# Patient Record
Sex: FEMALE | Race: WHITE | NOT HISPANIC OR LATINO
[De-identification: names, ages, dates, MRNs, and addresses within clinical notes are randomized per-mention and may not be internally consistent; named-entity substitution may affect disease eponyms.]

---

## 2021-04-16 ENCOUNTER — APPOINTMENT (OUTPATIENT)
Dept: FAMILY MEDICINE | Facility: CLINIC | Age: 31
End: 2021-04-16
Payer: COMMERCIAL

## 2021-04-16 ENCOUNTER — NON-APPOINTMENT (OUTPATIENT)
Age: 31
End: 2021-04-16

## 2021-04-16 VITALS
WEIGHT: 124 LBS | OXYGEN SATURATION: 99 % | TEMPERATURE: 98.4 F | SYSTOLIC BLOOD PRESSURE: 122 MMHG | HEART RATE: 77 BPM | HEIGHT: 63 IN | DIASTOLIC BLOOD PRESSURE: 82 MMHG | BODY MASS INDEX: 21.97 KG/M2

## 2021-04-16 DIAGNOSIS — Z78.9 OTHER SPECIFIED HEALTH STATUS: ICD-10-CM

## 2021-04-16 DIAGNOSIS — Z86.16 PERSONAL HISTORY OF COVID-19: ICD-10-CM

## 2021-04-16 DIAGNOSIS — Z82.49 FAMILY HISTORY OF ISCHEMIC HEART DISEASE AND OTHER DISEASES OF THE CIRCULATORY SYSTEM: ICD-10-CM

## 2021-04-16 PROCEDURE — 99213 OFFICE O/P EST LOW 20 MIN: CPT

## 2021-04-16 PROCEDURE — 99072 ADDL SUPL MATRL&STAF TM PHE: CPT

## 2021-04-16 RX ORDER — ASCORBIC ACID 500 MG
TABLET ORAL
Refills: 0 | Status: ACTIVE | COMMUNITY

## 2021-04-16 RX ORDER — MULTIVITAMIN
TABLET ORAL
Refills: 0 | Status: ACTIVE | COMMUNITY

## 2021-04-16 NOTE — HISTORY OF PRESENT ILLNESS
[FreeTextEntry1] : post COVID infection symptoms [de-identified] : Patient is s/p positive PCR test 3/31.  She had a fever, chills, body aches, a cough, body aches, chills, a sore throat, migraine headaches and the loss of taste and smell.  Most of her symptoms have resolved, but she does still have headaches, and a loss of taste and smell.    Overall, she does feel much better, and will be returning to work.  She would like to make sure she is stable to return.

## 2021-04-16 NOTE — PHYSICAL EXAM
[No Acute Distress] : no acute distress [Well Nourished] : well nourished [Well Developed] : well developed [Well-Appearing] : well-appearing [EOMI] : extraocular movements intact [Normal Outer Ear/Nose] : the outer ears and nose were normal in appearance [Normal Oropharynx] : the oropharynx was normal [No JVD] : no jugular venous distention [No Lymphadenopathy] : no lymphadenopathy [Supple] : supple [Thyroid Normal, No Nodules] : the thyroid was normal and there were no nodules present [No Respiratory Distress] : no respiratory distress  [No Accessory Muscle Use] : no accessory muscle use [Clear to Auscultation] : lungs were clear to auscultation bilaterally [Normal Rate] : normal rate  [Regular Rhythm] : with a regular rhythm [Normal S1, S2] : normal S1 and S2 [No Murmur] : no murmur heard [Soft] : abdomen soft [Non Tender] : non-tender [Non-distended] : non-distended [No Masses] : no abdominal mass palpated [No HSM] : no HSM [Normal Bowel Sounds] : normal bowel sounds [Normal Posterior Cervical Nodes] : no posterior cervical lymphadenopathy [Normal Anterior Cervical Nodes] : no anterior cervical lymphadenopathy [No Joint Swelling] : no joint swelling [Grossly Normal Strength/Tone] : grossly normal strength/tone [No Rash] : no rash [Coordination Grossly Intact] : coordination grossly intact [No Focal Deficits] : no focal deficits [Normal Gait] : normal gait [Deep Tendon Reflexes (DTR)] : deep tendon reflexes were 2+ and symmetric [Normal Affect] : the affect was normal [Normal Insight/Judgement] : insight and judgment were intact

## 2021-04-16 NOTE — PLAN
[FreeTextEntry1] : Patient has been reassured that her symptoms are to be expected as she heals from COVID- 19.  She is advised to make sure that she hydrates very well, takes zinc, vitamin C, B12 and black elderberry, and continues to eat well and exercise.  This will continue to take time to improve, but she will continue to do so with time and the above.  For any residual congestion, she may use Flonase and Mucinex.  She may also use Tylenol for any residual body aches, and for her headache.

## 2021-07-09 ENCOUNTER — APPOINTMENT (OUTPATIENT)
Dept: FAMILY MEDICINE | Facility: CLINIC | Age: 31
End: 2021-07-09

## 2021-07-13 ENCOUNTER — APPOINTMENT (OUTPATIENT)
Dept: FAMILY MEDICINE | Facility: CLINIC | Age: 31
End: 2021-07-13

## 2021-09-20 ENCOUNTER — APPOINTMENT (OUTPATIENT)
Dept: FAMILY MEDICINE | Facility: CLINIC | Age: 31
End: 2021-09-20

## 2021-10-11 ENCOUNTER — APPOINTMENT (OUTPATIENT)
Dept: FAMILY MEDICINE | Facility: CLINIC | Age: 31
End: 2021-10-11

## 2021-10-19 ENCOUNTER — APPOINTMENT (OUTPATIENT)
Dept: FAMILY MEDICINE | Facility: CLINIC | Age: 31
End: 2021-10-19
Payer: COMMERCIAL

## 2021-10-19 VITALS
WEIGHT: 123 LBS | SYSTOLIC BLOOD PRESSURE: 112 MMHG | BODY MASS INDEX: 21.79 KG/M2 | OXYGEN SATURATION: 99 % | DIASTOLIC BLOOD PRESSURE: 72 MMHG | TEMPERATURE: 98.8 F | RESPIRATION RATE: 16 BRPM | HEIGHT: 63 IN | HEART RATE: 80 BPM

## 2021-10-19 DIAGNOSIS — Z00.00 ENCOUNTER FOR GENERAL ADULT MEDICAL EXAMINATION W/OUT ABNORMAL FINDINGS: ICD-10-CM

## 2021-10-19 PROCEDURE — 36415 COLL VENOUS BLD VENIPUNCTURE: CPT

## 2021-10-19 PROCEDURE — G0442 ANNUAL ALCOHOL SCREEN 15 MIN: CPT

## 2021-10-19 PROCEDURE — 99395 PREV VISIT EST AGE 18-39: CPT | Mod: 25

## 2021-10-19 NOTE — HISTORY OF PRESENT ILLNESS
[FreeTextEntry1] : Routine PE [de-identified] : Patient's last PE was 1 year ago.  She follows a normal diet, and she does exercise regularly.  Patient's last visit with the dentist was less than 1 year ago, and she is now due for the ophthalmologist. She feels well overall. She is utd with all vaccinations aside from the COVID vaccine.

## 2021-10-19 NOTE — HEALTH RISK ASSESSMENT
[None] : None [Employed] : employed [College] : College [Single] : single [Sexually Active] : sexually active [Feels Safe at Home] : Feels safe at home [Fully functional (bathing, dressing, toileting, transferring, walking, feeding)] : Fully functional (bathing, dressing, toileting, transferring, walking, feeding) [Fully functional (using the telephone, shopping, preparing meals, housekeeping, doing laundry, using] : Fully functional and needs no help or supervision to perform IADLs (using the telephone, shopping, preparing meals, housekeeping, doing laundry, using transportation, managing medications and managing finances) [Reports normal functional visual acuity (ie: able to read med bottle)] : Reports normal functional visual acuity [Smoke Detector] : smoke detector [Carbon Monoxide Detector] : carbon monoxide detector [Safety elements used in home] : safety elements used in home [Seat Belt] :  uses seat belt [Sunscreen] : uses sunscreen [Yes] : Yes [Monthly or less (1 pt)] : Monthly or less (1 point) [1 or 2 (0 pts)] : 1 or 2 (0 points) [Never (0 pts)] : Never (0 points) [No] : In the past 12 months have you used drugs other than those required for medical reasons? No [No falls in past year] : Patient reported no falls in the past year [0] : 2) Feeling down, depressed, or hopeless: Not at all (0) [PHQ-2 Negative - No further assessment needed] : PHQ-2 Negative - No further assessment needed [Patient reported PAP Smear was normal] : Patient reported PAP Smear was normal [HIV Test offered] : HIV Test offered [With Significant Other] : lives with significant other [] : No [Audit-CScore] : 1 [DUJ0Eeslj] : 0 [Change in mental status noted] : No change in mental status noted [High Risk Behavior] : no high risk behavior [Reports changes in hearing] : Reports no changes in hearing [Reports changes in vision] : Reports no changes in vision [Reports changes in dental health] : Reports no changes in dental health [Guns at Home] : no guns at home [Travel to Developing Areas] : does not  travel to developing areas [TB Exposure] : is not being exposed to tuberculosis [PapSmearDate] : 4/2021 [FreeTextEntry2] : [facility]

## 2021-10-19 NOTE — PLAN
[FreeTextEntry1] : Patient is advised to continue with her current diet and exercise. She  is also advised to make sure that she follows up with the ophthalmologist and dentist annually which she does do. She is also advised to make sure to also follow up with the dermatologist for routine skin checks.  She is also advised to make sure that she exercises when possible, and follows up for any medical issues that arise. Her labs will be checked as noted above.  She is utd with all vaccinations aside from the COVID vaccine, and she will consider this for the future.  \par \par Pregnancy test also ordered as requested to confirm pregnancy.  She did have a positive urine test 2 weeks ago, and is scheduled to see gyn next month.  \par

## 2021-10-20 ENCOUNTER — NON-APPOINTMENT (OUTPATIENT)
Age: 31
End: 2021-10-20

## 2021-10-20 LAB
25(OH)D3 SERPL-MCNC: 43.3 NG/ML
ALBUMIN SERPL ELPH-MCNC: 4.3 G/DL
ALP BLD-CCNC: 45 U/L
ALT SERPL-CCNC: 8 U/L
ANION GAP SERPL CALC-SCNC: 15 MMOL/L
AST SERPL-CCNC: 12 U/L
BASOPHILS # BLD AUTO: 0.07 K/UL
BASOPHILS NFR BLD AUTO: 0.6 %
BILIRUB SERPL-MCNC: <0.2 MG/DL
BUN SERPL-MCNC: 14 MG/DL
CALCIUM SERPL-MCNC: 9.9 MG/DL
CHLORIDE SERPL-SCNC: 99 MMOL/L
CHOLEST SERPL-MCNC: 127 MG/DL
CO2 SERPL-SCNC: 20 MMOL/L
CREAT SERPL-MCNC: 0.55 MG/DL
CYTOLOGY CVX/VAG DOC THIN PREP: NORMAL
EOSINOPHIL # BLD AUTO: 0.27 K/UL
EOSINOPHIL NFR BLD AUTO: 2.5 %
GLUCOSE SERPL-MCNC: 91 MG/DL
HCG SERPL QL: POSITIVE
HCT VFR BLD CALC: 36.2 %
HDLC SERPL-MCNC: 49 MG/DL
HGB BLD-MCNC: 12.5 G/DL
IMM GRANULOCYTES NFR BLD AUTO: 0.4 %
LDLC SERPL CALC-MCNC: 68 MG/DL
LYMPHOCYTES # BLD AUTO: 2.24 K/UL
LYMPHOCYTES NFR BLD AUTO: 20.5 %
MAN DIFF?: NORMAL
MCHC RBC-ENTMCNC: 29.1 PG
MCHC RBC-ENTMCNC: 34.5 GM/DL
MCV RBC AUTO: 84.2 FL
MONOCYTES # BLD AUTO: 0.74 K/UL
MONOCYTES NFR BLD AUTO: 6.8 %
NEUTROPHILS # BLD AUTO: 7.59 K/UL
NEUTROPHILS NFR BLD AUTO: 69.2 %
NONHDLC SERPL-MCNC: 78 MG/DL
PAPP-A SERPL-ACNC: NORMAL MIU/ML
PLATELET # BLD AUTO: 240 K/UL
POTASSIUM SERPL-SCNC: 4.2 MMOL/L
PROT SERPL-MCNC: 6.9 G/DL
RBC # BLD: 4.3 M/UL
RBC # FLD: 12.3 %
SODIUM SERPL-SCNC: 135 MMOL/L
TRIGL SERPL-MCNC: 47 MG/DL
TSH SERPL-ACNC: 1.3 UIU/ML
WBC # FLD AUTO: 10.95 K/UL

## 2022-04-29 ENCOUNTER — INPATIENT (INPATIENT)
Facility: HOSPITAL | Age: 32
LOS: 0 days | Discharge: HOME | DRG: 833 | End: 2022-04-29
Attending: OBSTETRICS & GYNECOLOGY | Admitting: OBSTETRICS & GYNECOLOGY
Payer: COMMERCIAL

## 2022-04-29 DIAGNOSIS — O26.899 OTHER SPECIFIED PREGNANCY RELATED CONDITIONS, UNSPECIFIED TRIMESTER: ICD-10-CM

## 2022-04-29 LAB
ALBUMIN SERPL ELPH-MCNC: 2.9 G/DL — LOW (ref 3.3–5)
ALP SERPL-CCNC: 97 U/L — SIGNIFICANT CHANGE UP (ref 40–120)
ALT FLD-CCNC: 25 U/L — SIGNIFICANT CHANGE UP (ref 12–78)
ANION GAP SERPL CALC-SCNC: 6 MMOL/L — SIGNIFICANT CHANGE UP (ref 5–17)
APPEARANCE UR: CLEAR — SIGNIFICANT CHANGE UP
APTT BLD: 25.5 SEC — LOW (ref 27.5–35.5)
AST SERPL-CCNC: 21 U/L — SIGNIFICANT CHANGE UP (ref 15–37)
BASOPHILS # BLD AUTO: 0.05 K/UL — SIGNIFICANT CHANGE UP (ref 0–0.2)
BASOPHILS NFR BLD AUTO: 0.4 % — SIGNIFICANT CHANGE UP (ref 0–2)
BILIRUB SERPL-MCNC: 0.2 MG/DL — SIGNIFICANT CHANGE UP (ref 0.2–1.2)
BILIRUB UR-MCNC: NEGATIVE — SIGNIFICANT CHANGE UP
BUN SERPL-MCNC: 8 MG/DL — SIGNIFICANT CHANGE UP (ref 7–23)
CALCIUM SERPL-MCNC: 8.4 MG/DL — LOW (ref 8.5–10.1)
CHLORIDE SERPL-SCNC: 108 MMOL/L — SIGNIFICANT CHANGE UP (ref 96–108)
CO2 SERPL-SCNC: 24 MMOL/L — SIGNIFICANT CHANGE UP (ref 22–31)
COLOR SPEC: YELLOW — SIGNIFICANT CHANGE UP
CREAT ?TM UR-MCNC: 123 MG/DL — SIGNIFICANT CHANGE UP
CREAT SERPL-MCNC: 0.46 MG/DL — LOW (ref 0.5–1.3)
DIFF PNL FLD: NEGATIVE — SIGNIFICANT CHANGE UP
EGFR: 133 ML/MIN/1.73M2 — SIGNIFICANT CHANGE UP
EOSINOPHIL # BLD AUTO: 0.1 K/UL — SIGNIFICANT CHANGE UP (ref 0–0.5)
EOSINOPHIL NFR BLD AUTO: 0.9 % — SIGNIFICANT CHANGE UP (ref 0–6)
FIBRINOGEN PPP-MCNC: 512 MG/DL — SIGNIFICANT CHANGE UP (ref 330–520)
FIBRONECTIN FETAL SPEC QL: NEGATIVE — SIGNIFICANT CHANGE UP
GLUCOSE SERPL-MCNC: 89 MG/DL — SIGNIFICANT CHANGE UP (ref 70–99)
GLUCOSE UR QL: NEGATIVE — SIGNIFICANT CHANGE UP
HCT VFR BLD CALC: 32.2 % — LOW (ref 34.5–45)
HGB BLD-MCNC: 11 G/DL — LOW (ref 11.5–15.5)
IMM GRANULOCYTES NFR BLD AUTO: 0.5 % — SIGNIFICANT CHANGE UP (ref 0–1.5)
INR BLD: 0.95 RATIO — SIGNIFICANT CHANGE UP (ref 0.88–1.16)
KETONES UR-MCNC: NEGATIVE — SIGNIFICANT CHANGE UP
LDH SERPL L TO P-CCNC: 172 U/L — SIGNIFICANT CHANGE UP (ref 84–241)
LEUKOCYTE ESTERASE UR-ACNC: ABNORMAL
LYMPHOCYTES # BLD AUTO: 1.24 K/UL — SIGNIFICANT CHANGE UP (ref 1–3.3)
LYMPHOCYTES # BLD AUTO: 10.6 % — LOW (ref 13–44)
MCHC RBC-ENTMCNC: 29.3 PG — SIGNIFICANT CHANGE UP (ref 27–34)
MCHC RBC-ENTMCNC: 34.2 GM/DL — SIGNIFICANT CHANGE UP (ref 32–36)
MCV RBC AUTO: 85.9 FL — SIGNIFICANT CHANGE UP (ref 80–100)
MONOCYTES # BLD AUTO: 0.69 K/UL — SIGNIFICANT CHANGE UP (ref 0–0.9)
MONOCYTES NFR BLD AUTO: 5.9 % — SIGNIFICANT CHANGE UP (ref 2–14)
NEUTROPHILS # BLD AUTO: 9.53 K/UL — HIGH (ref 1.8–7.4)
NEUTROPHILS NFR BLD AUTO: 81.7 % — HIGH (ref 43–77)
NITRITE UR-MCNC: NEGATIVE — SIGNIFICANT CHANGE UP
PH UR: 5 — SIGNIFICANT CHANGE UP (ref 5–8)
PLATELET # BLD AUTO: 161 K/UL — SIGNIFICANT CHANGE UP (ref 150–400)
POTASSIUM SERPL-MCNC: 3.9 MMOL/L — SIGNIFICANT CHANGE UP (ref 3.5–5.3)
POTASSIUM SERPL-SCNC: 3.9 MMOL/L — SIGNIFICANT CHANGE UP (ref 3.5–5.3)
PROT ?TM UR-MCNC: 22 MG/DL — HIGH (ref 0–12)
PROT SERPL-MCNC: 6.2 GM/DL — SIGNIFICANT CHANGE UP (ref 6–8.3)
PROT UR-MCNC: NEGATIVE — SIGNIFICANT CHANGE UP
PROT/CREAT UR-RTO: 0.2 RATIO — SIGNIFICANT CHANGE UP (ref 0–0.2)
PROTHROM AB SERPL-ACNC: 11 SEC — SIGNIFICANT CHANGE UP (ref 10.5–13.4)
RBC # BLD: 3.75 M/UL — LOW (ref 3.8–5.2)
RBC # FLD: 13.2 % — SIGNIFICANT CHANGE UP (ref 10.3–14.5)
SODIUM SERPL-SCNC: 138 MMOL/L — SIGNIFICANT CHANGE UP (ref 135–145)
SP GR SPEC: 1.02 — SIGNIFICANT CHANGE UP (ref 1.01–1.02)
URATE SERPL-MCNC: 4.1 MG/DL — SIGNIFICANT CHANGE UP (ref 2.5–7)
UROBILINOGEN FLD QL: NEGATIVE — SIGNIFICANT CHANGE UP
WBC # BLD: 11.67 K/UL — HIGH (ref 3.8–10.5)
WBC # FLD AUTO: 11.67 K/UL — HIGH (ref 3.8–10.5)

## 2022-04-29 PROCEDURE — 84550 ASSAY OF BLOOD/URIC ACID: CPT

## 2022-04-29 PROCEDURE — 76815 OB US LIMITED FETUS(S): CPT | Mod: 26

## 2022-04-29 PROCEDURE — 59025 FETAL NON-STRESS TEST: CPT

## 2022-04-29 PROCEDURE — G0463: CPT

## 2022-04-29 PROCEDURE — 76817 TRANSVAGINAL US OBSTETRIC: CPT

## 2022-04-29 PROCEDURE — 85025 COMPLETE CBC W/AUTO DIFF WBC: CPT

## 2022-04-29 PROCEDURE — 81001 URINALYSIS AUTO W/SCOPE: CPT

## 2022-04-29 PROCEDURE — 80053 COMPREHEN METABOLIC PANEL: CPT

## 2022-04-29 PROCEDURE — 96360 HYDRATION IV INFUSION INIT: CPT

## 2022-04-29 PROCEDURE — 87800 DETECT AGNT MULT DNA DIREC: CPT

## 2022-04-29 PROCEDURE — 85730 THROMBOPLASTIN TIME PARTIAL: CPT

## 2022-04-29 PROCEDURE — 85384 FIBRINOGEN ACTIVITY: CPT

## 2022-04-29 PROCEDURE — 99283 EMERGENCY DEPT VISIT LOW MDM: CPT

## 2022-04-29 PROCEDURE — 84156 ASSAY OF PROTEIN URINE: CPT

## 2022-04-29 PROCEDURE — 76817 TRANSVAGINAL US OBSTETRIC: CPT | Mod: 26

## 2022-04-29 PROCEDURE — 83615 LACTATE (LD) (LDH) ENZYME: CPT

## 2022-04-29 PROCEDURE — 87086 URINE CULTURE/COLONY COUNT: CPT

## 2022-04-29 PROCEDURE — 36415 COLL VENOUS BLD VENIPUNCTURE: CPT

## 2022-04-29 PROCEDURE — 82570 ASSAY OF URINE CREATININE: CPT

## 2022-04-29 PROCEDURE — 82731 ASSAY OF FETAL FIBRONECTIN: CPT

## 2022-04-29 PROCEDURE — 85610 PROTHROMBIN TIME: CPT

## 2022-04-29 PROCEDURE — 76815 OB US LIMITED FETUS(S): CPT

## 2022-04-29 RX ORDER — SODIUM CHLORIDE 9 MG/ML
1000 INJECTION, SOLUTION INTRAVENOUS ONCE
Refills: 0 | Status: COMPLETED | OUTPATIENT
Start: 2022-04-29 | End: 2022-04-29

## 2022-04-29 RX ADMIN — SODIUM CHLORIDE 1000 MILLILITER(S): 9 INJECTION, SOLUTION INTRAVENOUS at 12:48

## 2022-04-30 LAB
CANDIDA AB TITR SER: SIGNIFICANT CHANGE UP
G VAGINALIS DNA SPEC QL NAA+PROBE: DETECTED
T VAGINALIS SPEC QL WET PREP: SIGNIFICANT CHANGE UP

## 2022-05-01 LAB
CULTURE RESULTS: SIGNIFICANT CHANGE UP
SPECIMEN SOURCE: SIGNIFICANT CHANGE UP

## 2022-05-02 DIAGNOSIS — O60.03 PRETERM LABOR WITHOUT DELIVERY, THIRD TRIMESTER: ICD-10-CM

## 2022-05-02 DIAGNOSIS — Z3A.33 33 WEEKS GESTATION OF PREGNANCY: ICD-10-CM

## 2022-05-02 DIAGNOSIS — R51.9 HEADACHE, UNSPECIFIED: ICD-10-CM

## 2022-05-02 DIAGNOSIS — O99.891 OTHER SPECIFIED DISEASES AND CONDITIONS COMPLICATING PREGNANCY: ICD-10-CM

## 2022-06-14 ENCOUNTER — INPATIENT (INPATIENT)
Facility: HOSPITAL | Age: 32
LOS: 2 days | Discharge: HOME | End: 2022-06-17
Attending: OBSTETRICS & GYNECOLOGY | Admitting: OBSTETRICS & GYNECOLOGY
Payer: COMMERCIAL

## 2022-06-14 VITALS — WEIGHT: 153 LBS | HEIGHT: 62 IN

## 2022-06-14 DIAGNOSIS — O26.899 OTHER SPECIFIED PREGNANCY RELATED CONDITIONS, UNSPECIFIED TRIMESTER: ICD-10-CM

## 2022-06-14 LAB
BASOPHILS # BLD AUTO: 0.05 K/UL — SIGNIFICANT CHANGE UP (ref 0–0.2)
BASOPHILS NFR BLD AUTO: 0.5 % — SIGNIFICANT CHANGE UP (ref 0–2)
COVID-19 SPIKE DOMAIN AB INTERP: POSITIVE
COVID-19 SPIKE DOMAIN ANTIBODY RESULT: >250 U/ML — HIGH
EOSINOPHIL # BLD AUTO: 0.14 K/UL — SIGNIFICANT CHANGE UP (ref 0–0.5)
EOSINOPHIL NFR BLD AUTO: 1.4 % — SIGNIFICANT CHANGE UP (ref 0–6)
HCT VFR BLD CALC: 35.5 % — SIGNIFICANT CHANGE UP (ref 34.5–45)
HGB BLD-MCNC: 12.5 G/DL — SIGNIFICANT CHANGE UP (ref 11.5–15.5)
IMM GRANULOCYTES NFR BLD AUTO: 0.5 % — SIGNIFICANT CHANGE UP (ref 0–1.5)
LYMPHOCYTES # BLD AUTO: 1.33 K/UL — SIGNIFICANT CHANGE UP (ref 1–3.3)
LYMPHOCYTES # BLD AUTO: 13.7 % — SIGNIFICANT CHANGE UP (ref 13–44)
MCHC RBC-ENTMCNC: 29.7 PG — SIGNIFICANT CHANGE UP (ref 27–34)
MCHC RBC-ENTMCNC: 35.2 GM/DL — SIGNIFICANT CHANGE UP (ref 32–36)
MCV RBC AUTO: 84.3 FL — SIGNIFICANT CHANGE UP (ref 80–100)
MONOCYTES # BLD AUTO: 0.56 K/UL — SIGNIFICANT CHANGE UP (ref 0–0.9)
MONOCYTES NFR BLD AUTO: 5.8 % — SIGNIFICANT CHANGE UP (ref 2–14)
NEUTROPHILS # BLD AUTO: 7.56 K/UL — HIGH (ref 1.8–7.4)
NEUTROPHILS NFR BLD AUTO: 78.1 % — HIGH (ref 43–77)
PLATELET # BLD AUTO: 152 K/UL — SIGNIFICANT CHANGE UP (ref 150–400)
RBC # BLD: 4.21 M/UL — SIGNIFICANT CHANGE UP (ref 3.8–5.2)
RBC # FLD: 13.5 % — SIGNIFICANT CHANGE UP (ref 10.3–14.5)
SARS-COV-2 IGG+IGM SERPL QL IA: >250 U/ML — HIGH
SARS-COV-2 IGG+IGM SERPL QL IA: POSITIVE
WBC # BLD: 9.69 K/UL — SIGNIFICANT CHANGE UP (ref 3.8–10.5)
WBC # FLD AUTO: 9.69 K/UL — SIGNIFICANT CHANGE UP (ref 3.8–10.5)

## 2022-06-14 PROCEDURE — 86900 BLOOD TYPING SEROLOGIC ABO: CPT

## 2022-06-14 PROCEDURE — 94760 N-INVAS EAR/PLS OXIMETRY 1: CPT

## 2022-06-14 PROCEDURE — 86769 SARS-COV-2 COVID-19 ANTIBODY: CPT

## 2022-06-14 PROCEDURE — 85014 HEMATOCRIT: CPT

## 2022-06-14 PROCEDURE — 59050 FETAL MONITOR W/REPORT: CPT

## 2022-06-14 PROCEDURE — 85018 HEMOGLOBIN: CPT

## 2022-06-14 PROCEDURE — 86850 RBC ANTIBODY SCREEN: CPT

## 2022-06-14 PROCEDURE — 85461 HEMOGLOBIN FETAL: CPT

## 2022-06-14 PROCEDURE — 85025 COMPLETE CBC W/AUTO DIFF WBC: CPT

## 2022-06-14 PROCEDURE — 36415 COLL VENOUS BLD VENIPUNCTURE: CPT

## 2022-06-14 PROCEDURE — 86901 BLOOD TYPING SEROLOGIC RH(D): CPT

## 2022-06-14 PROCEDURE — C1889: CPT

## 2022-06-14 PROCEDURE — 86780 TREPONEMA PALLIDUM: CPT

## 2022-06-14 RX ORDER — OXYTOCIN 10 UNIT/ML
333.33 VIAL (ML) INJECTION
Qty: 20 | Refills: 0 | Status: COMPLETED | OUTPATIENT
Start: 2022-06-14 | End: 2022-06-14

## 2022-06-14 RX ORDER — SODIUM CHLORIDE 9 MG/ML
1000 INJECTION, SOLUTION INTRAVENOUS
Refills: 0 | Status: DISCONTINUED | OUTPATIENT
Start: 2022-06-14 | End: 2022-06-15

## 2022-06-14 RX ORDER — OXYTOCIN 10 UNIT/ML
2 VIAL (ML) INJECTION
Qty: 30 | Refills: 0 | Status: DISCONTINUED | OUTPATIENT
Start: 2022-06-14 | End: 2022-06-17

## 2022-06-14 RX ORDER — CITRIC ACID/SODIUM CITRATE 300-500 MG
15 SOLUTION, ORAL ORAL EVERY 6 HOURS
Refills: 0 | Status: DISCONTINUED | OUTPATIENT
Start: 2022-06-14 | End: 2022-06-15

## 2022-06-14 NOTE — PATIENT PROFILE OB - AS SC BRADEN NUTRITION
[facility]    [address]    [facility] [address]    Phone:  [phone]       Thank You for choosing us for your health care visit. We are glad to serve you and happy to provide you with this summary of your visit. Please help us to ensure we have accurate records. If you find anything that needs to be changed, please let our staff know as soon as possible.          Your Demographic Information     Patient Name Sex Capo Tobar Male 2001       Ethnic Group Patient Race    Not of  or  Origin White      Your Visit Details     Date & Time Provider Department    3/7/2017 5:45 PM Kiara Freeman NP [facility]      Your To Do List     Follow-Up    Return in about 7 days (around 3/14/2017), or if symptoms worsen or fail to improve.      We Ordered or Performed the Following     LACE UP ANKLE BRACE     XR ANKLE 3+ VW RIGHT       Conditions Discussed Today or Order-Related Diagnoses        Comments    Injury of right ankle, initial encounter    -  Primary       Your Vitals Were     BP Pulse Temp Resp Height Weight    110/70 (40 %/ 70 %)* 88 99.1 °F (37.3 °C) 20 5' 6\" (1.676 m) (69 %, Z= 0.49)† 180 lb (81.6 kg) (98 %, Z= 2.13)†    BMI Smoking Status                29.05 kg/m2 (98 %, Z= 2.01)† Never Smoker        *BP percentiles are based on NHBPEP's 4th Report    †Growth percentiles are based on CDC 2-20 Years data.      Medications Prescribed or Re-Ordered Today     None      Allergies     No Known Allergies      Results of Testing Done Today     XR ANKLE 3+ VW RIGHT                     Patient Instructions      Treating Ankle Sprains  Treatment will depend on how bad your sprain is. For a severe sprain, healing may take 3 months or more.  Right after your injury: Use R.I.C.E.  · Rest: At first, keep weight off the ankle as much as you can. You may be given crutches to help you walk without putting weight on  the ankle.  · Ice: Put an ice pack on the ankle for 15 minutes. Remove the pack and wait at least 30 minutes. Repeat for up to 3 days. This helps reduce swelling.  · Compression: To reduce swelling and keep the joint stable, you may need to wrap the ankle with an elastic bandage. For more severe sprains, you may need an ankle brace or a cast.  · Elevation: To reduce swelling, keep your ankle raised above your heart when you sit or lie down.  Medicine  Your healthcare provider may suggest oral non-steroidal anti-inflammatory medicine (NSAIDs), such as ibuprofen. This relieves the pain and helps reduce any swelling. Be sure to take your medicine as directed.  Contrast baths  After 3 days, soak your ankle in warm water for 30 seconds, then in cool water for 30 seconds. Go back and forth for 5 minutes. Doing this every 2 hours will help keep the swelling down.  Exercises    After about 2 to 3 weeks, you may be given exercises to strengthen the ligaments and muscles in the ankle. Doing these exercises will help prevent another ankle sprain. Exercises may include standing on your toes and then on your heels and doing ankle curls.  Ankle curls  · Sit on the edge of a sturdy table or lie on your back.  · Pull your toes toward you. Then point them away from you. Repeat for 2 to 3 minutes.   © 7296-8425 [employer]. [address] [place] [address], [place] [address]. All rights reserved. This information is not intended as a substitute for professional medical care. Always follow your healthcare professional's instructions.              (4) excellent

## 2022-06-14 NOTE — PATIENT PROFILE OB - FALL HARM RISK - UNIVERSAL INTERVENTIONS
Bed in lowest position, wheels locked, appropriate side rails in place/Call bell, personal items and telephone in reach/Instruct patient to call for assistance before getting out of bed or chair/[place] to call system/Physically safe environment - no spills, clutter or unnecessary equipment/Purposeful Proactive Rounding/Room/bathroom lighting operational, light cord in reach

## 2022-06-15 LAB — T PALLIDUM AB TITR SER: NEGATIVE — SIGNIFICANT CHANGE UP

## 2022-06-15 RX ORDER — DIBUCAINE 1 %
1 OINTMENT (GRAM) RECTAL EVERY 6 HOURS
Refills: 0 | Status: DISCONTINUED | OUTPATIENT
Start: 2022-06-15 | End: 2022-06-17

## 2022-06-15 RX ORDER — SENNA PLUS 8.6 MG/1
1 TABLET ORAL
Refills: 0 | Status: DISCONTINUED | OUTPATIENT
Start: 2022-06-15 | End: 2022-06-17

## 2022-06-15 RX ORDER — OXYCODONE HYDROCHLORIDE 5 MG/1
5 TABLET ORAL
Refills: 0 | Status: DISCONTINUED | OUTPATIENT
Start: 2022-06-15 | End: 2022-06-17

## 2022-06-15 RX ORDER — OXYTOCIN 10 UNIT/ML
333.33 VIAL (ML) INJECTION
Qty: 20 | Refills: 0 | Status: DISCONTINUED | OUTPATIENT
Start: 2022-06-15 | End: 2022-06-17

## 2022-06-15 RX ORDER — SIMETHICONE 80 MG/1
80 TABLET, CHEWABLE ORAL EVERY 4 HOURS
Refills: 0 | Status: DISCONTINUED | OUTPATIENT
Start: 2022-06-15 | End: 2022-06-17

## 2022-06-15 RX ORDER — HYDROCORTISONE 1 %
1 OINTMENT (GRAM) TOPICAL EVERY 6 HOURS
Refills: 0 | Status: DISCONTINUED | OUTPATIENT
Start: 2022-06-15 | End: 2022-06-17

## 2022-06-15 RX ORDER — AER TRAVELER 0.5 G/1
1 SOLUTION RECTAL; TOPICAL EVERY 4 HOURS
Refills: 0 | Status: DISCONTINUED | OUTPATIENT
Start: 2022-06-15 | End: 2022-06-17

## 2022-06-15 RX ORDER — PRAMOXINE HYDROCHLORIDE 150 MG/15G
1 AEROSOL, FOAM RECTAL EVERY 4 HOURS
Refills: 0 | Status: DISCONTINUED | OUTPATIENT
Start: 2022-06-15 | End: 2022-06-17

## 2022-06-15 RX ORDER — MAGNESIUM HYDROXIDE 400 MG/1
30 TABLET, CHEWABLE ORAL
Refills: 0 | Status: DISCONTINUED | OUTPATIENT
Start: 2022-06-15 | End: 2022-06-17

## 2022-06-15 RX ORDER — BENZOCAINE 10 %
1 GEL (GRAM) MUCOUS MEMBRANE EVERY 6 HOURS
Refills: 0 | Status: DISCONTINUED | OUTPATIENT
Start: 2022-06-15 | End: 2022-06-17

## 2022-06-15 RX ORDER — ACETAMINOPHEN 500 MG
975 TABLET ORAL
Refills: 0 | Status: DISCONTINUED | OUTPATIENT
Start: 2022-06-15 | End: 2022-06-17

## 2022-06-15 RX ORDER — TETANUS TOXOID, REDUCED DIPHTHERIA TOXOID AND ACELLULAR PERTUSSIS VACCINE, ADSORBED 5; 2.5; 8; 8; 2.5 [IU]/.5ML; [IU]/.5ML; UG/.5ML; UG/.5ML; UG/.5ML
0.5 SUSPENSION INTRAMUSCULAR ONCE
Refills: 0 | Status: DISCONTINUED | OUTPATIENT
Start: 2022-06-15 | End: 2022-06-17

## 2022-06-15 RX ORDER — SODIUM CHLORIDE 9 MG/ML
3 INJECTION INTRAMUSCULAR; INTRAVENOUS; SUBCUTANEOUS EVERY 8 HOURS
Refills: 0 | Status: DISCONTINUED | OUTPATIENT
Start: 2022-06-15 | End: 2022-06-17

## 2022-06-15 RX ORDER — DIPHENHYDRAMINE HCL 50 MG
25 CAPSULE ORAL EVERY 6 HOURS
Refills: 0 | Status: DISCONTINUED | OUTPATIENT
Start: 2022-06-15 | End: 2022-06-17

## 2022-06-15 RX ORDER — LANOLIN
1 OINTMENT (GRAM) TOPICAL EVERY 6 HOURS
Refills: 0 | Status: DISCONTINUED | OUTPATIENT
Start: 2022-06-15 | End: 2022-06-17

## 2022-06-15 RX ORDER — IBUPROFEN 200 MG
600 TABLET ORAL EVERY 6 HOURS
Refills: 0 | Status: COMPLETED | OUTPATIENT
Start: 2022-06-15 | End: 2023-05-14

## 2022-06-15 RX ORDER — OXYCODONE HYDROCHLORIDE 5 MG/1
5 TABLET ORAL ONCE
Refills: 0 | Status: DISCONTINUED | OUTPATIENT
Start: 2022-06-15 | End: 2022-06-17

## 2022-06-15 RX ORDER — IBUPROFEN 200 MG
600 TABLET ORAL EVERY 6 HOURS
Refills: 0 | Status: DISCONTINUED | OUTPATIENT
Start: 2022-06-15 | End: 2022-06-17

## 2022-06-15 RX ORDER — KETOROLAC TROMETHAMINE 30 MG/ML
30 SYRINGE (ML) INJECTION ONCE
Refills: 0 | Status: DISCONTINUED | OUTPATIENT
Start: 2022-06-15 | End: 2022-06-15

## 2022-06-15 RX ADMIN — Medication 600 MILLIGRAM(S): at 22:20

## 2022-06-15 RX ADMIN — Medication 600 MILLIGRAM(S): at 21:26

## 2022-06-15 RX ADMIN — Medication 975 MILLIGRAM(S): at 18:45

## 2022-06-15 RX ADMIN — Medication 2 MILLIUNIT(S)/MIN: at 07:30

## 2022-06-15 RX ADMIN — Medication 1000 MILLIUNIT(S)/MIN: at 09:10

## 2022-06-15 RX ADMIN — SODIUM CHLORIDE 3 MILLILITER(S): 9 INJECTION INTRAMUSCULAR; INTRAVENOUS; SUBCUTANEOUS at 14:02

## 2022-06-15 RX ADMIN — Medication 600 MILLIGRAM(S): at 15:30

## 2022-06-15 RX ADMIN — Medication 975 MILLIGRAM(S): at 13:00

## 2022-06-15 RX ADMIN — Medication 30 MILLIGRAM(S): at 08:10

## 2022-06-15 RX ADMIN — Medication 600 MILLIGRAM(S): at 15:31

## 2022-06-15 RX ADMIN — Medication 975 MILLIGRAM(S): at 18:49

## 2022-06-16 LAB
FETAL SCREEN: SIGNIFICANT CHANGE UP
HCT VFR BLD CALC: 30.6 % — LOW (ref 34.5–45)
HGB BLD-MCNC: 10.4 G/DL — LOW (ref 11.5–15.5)

## 2022-06-16 RX ADMIN — Medication 600 MILLIGRAM(S): at 18:49

## 2022-06-16 RX ADMIN — Medication 1 TABLET(S): at 09:16

## 2022-06-16 RX ADMIN — Medication 975 MILLIGRAM(S): at 15:57

## 2022-06-16 RX ADMIN — Medication 600 MILLIGRAM(S): at 06:26

## 2022-06-16 RX ADMIN — Medication 975 MILLIGRAM(S): at 21:03

## 2022-06-16 RX ADMIN — SODIUM CHLORIDE 3 MILLILITER(S): 9 INJECTION INTRAMUSCULAR; INTRAVENOUS; SUBCUTANEOUS at 06:06

## 2022-06-16 RX ADMIN — Medication 600 MILLIGRAM(S): at 13:18

## 2022-06-16 RX ADMIN — Medication 975 MILLIGRAM(S): at 03:18

## 2022-06-16 RX ADMIN — Medication 975 MILLIGRAM(S): at 09:16

## 2022-06-16 RX ADMIN — Medication 975 MILLIGRAM(S): at 04:18

## 2022-06-16 RX ADMIN — Medication 600 MILLIGRAM(S): at 06:55

## 2022-06-16 RX ADMIN — Medication 975 MILLIGRAM(S): at 22:01

## 2022-06-16 NOTE — PROGRESS NOTE ADULT - SUBJECTIVE AND OBJECTIVE BOX
S: PPD# 1  Patient doing well. Minimal lochia. No complaints.     O: Vital Signs Last 24 Hrs  T(C): 36.2 (2022 05:00), Max: 36.7 (15 Ermias 2022 10:45)  T(F): 97.2 (2022 05:00), Max: 98.1 (15 Ermias 2022 22:00)  HR: 77 (2022 05:00) (69 - 99)  BP: 112/65 (2022 05:00) (99/54 - 124/66)  BP(mean): --  RR: 16 (2022 05:00) (16 - 17)  SpO2: 99% (2022 05:00) (98% - 100%)    Gen: NAD  Abd: soft, NT, ND, fundus firm below umbilicus  Ext: no tenderness  Perineum: intact  Labs:                        10.4   x     )-----------( x        ( 2022 06:53 )             30.6      A: 32y PPD# 1 s/p      Doing well    Plan:  Analgesia as needed  Routine post partum care

## 2022-06-17 ENCOUNTER — TRANSCRIPTION ENCOUNTER (OUTPATIENT)
Age: 32
End: 2022-06-17

## 2022-06-17 VITALS
OXYGEN SATURATION: 98 % | DIASTOLIC BLOOD PRESSURE: 57 MMHG | TEMPERATURE: 98 F | RESPIRATION RATE: 17 BRPM | HEART RATE: 88 BPM | SYSTOLIC BLOOD PRESSURE: 107 MMHG

## 2022-06-17 RX ADMIN — Medication 975 MILLIGRAM(S): at 03:03

## 2022-06-17 RX ADMIN — Medication 975 MILLIGRAM(S): at 09:02

## 2022-06-17 RX ADMIN — Medication 600 MILLIGRAM(S): at 06:21

## 2022-06-17 NOTE — DISCHARGE NOTE OB - CARE PROVIDER_API CALL
Surendra Morgan)  Obstetrics and Gynecology  [address] [place] [address]  Phone: [phone]  Fax: [phone]  Follow Up Time: 2 weeks

## 2022-06-17 NOTE — DISCHARGE NOTE OB - CARE PROVIDERS DIRECT ADDRESSES
[email or web address]

## 2022-06-17 NOTE — DISCHARGE NOTE OB - NS MD DC FALL RISK RISK
For information on Fall & Injury Prevention, visit: [email or web address] OR  [email or web address] OR  https://www.cdc.gov/steadi/patient.html

## 2022-06-17 NOTE — DISCHARGE NOTE OB - PATIENT PORTAL LINK FT
You can access the FollowMyHealth Patient Portal offered by [facility] by registering at the following website: [email or web address]. By joining [employer]’s FollowMyHealth portal, you will also be able to view your health information using other applications (apps) compatible with our system.

## 2022-06-17 NOTE — DISCHARGE NOTE OB - MATERIALS PROVIDED
[Subsequent Evaluation] : a subsequent evaluation for Vaccinations/[facility]  Screening Program/  Immunization Record/Breastfeeding Log/Breastfeeding Mother’s Support Group Information/Guide to Postpartum Care/[facility] Hearing Screen Program/Back To Sleep Handout/Shaken Baby Prevention Handout/Breastfeeding Guide and Packet

## 2022-06-17 NOTE — PROGRESS NOTE ADULT - SUBJECTIVE AND OBJECTIVE BOX
PPD #2    S: Patient doing well. Minimal lochia. No complaints.    O: Vital Signs Last 24 Hrs  T(C): 36.4 (2022 23:18), Max: 36.7 (2022 16:10)  T(F): 97.6 (2022 23:18), Max: 98.1 (2022 16:10)  HR: 88 (2022 23:18) (86 - 97)  BP: 101/58 (2022 23:18) (101/58 - 122/64)  BP(mean): --  RR: 16 (2022 23:18) (16 - 17)  SpO2: 100% (2022 23:18) (99% - 100%)    Gen: NAD  Abd: soft, NT, ND, fundus firm below umbilicus  Ext: no tenderness  Perineum: intact    Labs:                        10.4   x     )-----------( x        ( 2022 06:53 )             30.6        A: 32y PPD# 2 s/p      Doing well    Plan:  Discharge home.  vaginal restrictions only   Follow up 2 weeks for post partum visit.  Call office for any fevers, chills, heavy vaginal bleeding, symptoms of depression, or any other concerning symptoms.  Analgesia as directed

## 2022-06-22 ENCOUNTER — NON-APPOINTMENT (OUTPATIENT)
Age: 32
End: 2022-06-22

## 2022-06-29 DIAGNOSIS — Z67.41 TYPE O BLOOD, RH NEGATIVE: ICD-10-CM

## 2022-06-29 DIAGNOSIS — O26.893 OTHER SPECIFIED PREGNANCY RELATED CONDITIONS, THIRD TRIMESTER: ICD-10-CM

## 2022-06-29 DIAGNOSIS — Z3A.40 40 WEEKS GESTATION OF PREGNANCY: ICD-10-CM

## 2022-08-01 ENCOUNTER — NON-APPOINTMENT (OUTPATIENT)
Age: 32
End: 2022-08-01

## 2022-08-01 DIAGNOSIS — R87.618 OTHER ABNORMAL CYTOLOGICAL FINDINGS ON SPECIMENS FROM CERVIX UTERI: ICD-10-CM

## 2022-08-01 DIAGNOSIS — Z98.890 OTHER SPECIFIED POSTPROCEDURAL STATES: ICD-10-CM

## 2022-08-01 DIAGNOSIS — Z87.2 PERSONAL HISTORY OF DISEASES OF THE SKIN AND SUBCUTANEOUS TISSUE: ICD-10-CM

## 2022-08-01 DIAGNOSIS — Z87.42 PERSONAL HISTORY OF OTHER DISEASES OF THE FEMALE GENITAL TRACT: ICD-10-CM

## 2022-08-01 DIAGNOSIS — Z78.9 OTHER SPECIFIED HEALTH STATUS: ICD-10-CM

## 2022-08-01 DIAGNOSIS — Z01.419 ENCOUNTER FOR GYNECOLOGICAL EXAMINATION (GENERAL) (ROUTINE) W/OUT ABNORMAL FINDINGS: ICD-10-CM

## 2022-08-03 ENCOUNTER — APPOINTMENT (OUTPATIENT)
Dept: OBGYN | Facility: CLINIC | Age: 32
End: 2022-08-03

## 2022-08-03 ENCOUNTER — RESULT CHARGE (OUTPATIENT)
Age: 32
End: 2022-08-03

## 2022-08-03 VITALS
HEART RATE: 71 BPM | WEIGHT: 129 LBS | DIASTOLIC BLOOD PRESSURE: 83 MMHG | SYSTOLIC BLOOD PRESSURE: 128 MMHG | HEIGHT: 62 IN | BODY MASS INDEX: 23.74 KG/M2

## 2022-08-03 LAB — HEMOGLOBIN: 13.1

## 2022-08-03 PROCEDURE — 0503F POSTPARTUM CARE VISIT: CPT

## 2022-08-03 NOTE — END OF VISIT
[FreeTextEntry3] : Written by Brandy SCHOFIELD, acting as a scribe for Dr. Morgan. This note accurately reflects the work and decisions made by me.\par

## 2022-08-03 NOTE — HISTORY OF PRESENT ILLNESS
[Postpartum Follow Up] : postpartum follow up [Complications:___] : no complications [Delivery Date: ___] : on [unfilled] [Breastfeeding] : currently nursing [BF with Difficulty] : nursing without difficulty [Episiotomy Site Pain] : episiotomy site pain [Back to Normal] : is back to normal in size [None] : no vaginal bleeding [Normal] : the vagina was normal [Healing Well] : is healing well [Not Done] : Examination of breasts not done [Doing Well] : is doing well [de-identified] : follow up in in november for annual visit. pt to start thinking about OCPs, declines at this time.  [FreeTextEntry1] : Pt here today for 6 week post op visit. pt having persistent vaginal pain, laceration area c/d/i. Pt breastfeeding, tolerating well. Denies need for OCPs at this time.

## 2022-09-06 ENCOUNTER — NON-APPOINTMENT (OUTPATIENT)
Age: 32
End: 2022-09-06

## 2022-09-06 DIAGNOSIS — N61.0 MASTITIS WITHOUT ABSCESS: ICD-10-CM

## 2022-09-09 ENCOUNTER — NON-APPOINTMENT (OUTPATIENT)
Age: 32
End: 2022-09-09

## 2022-09-20 ENCOUNTER — APPOINTMENT (OUTPATIENT)
Dept: OBGYN | Facility: CLINIC | Age: 32
End: 2022-09-20

## 2022-09-20 PROCEDURE — 99212 OFFICE O/P EST SF 10 MIN: CPT

## 2022-09-20 NOTE — PHYSICAL EXAM
[Chaperone Present] : A chaperone was present in the examining room during all aspects of the physical examination [Tender] : tenderness [FreeTextEntry1] : Brandy MANJARREZ-SAYDA chaperoned during entire physical exam [Mass] : no breast mass [Nipple Discharge] : no nipple discharge [Axillary LAD] : no axillary lymphadenopathy

## 2022-09-20 NOTE — CHIEF COMPLAINT
[Urgent Visit] : Urgent Visit [FreeTextEntry1] : patient is a 33 y/o female s/p delivery in june. Patient is here to check her breasts, patient was just treated for mastitis. Denies fever at this time, she denies any symptoms at this time. Patient is exclusively breast feeding. She just finished a course of dicloxacillin.

## 2022-09-20 NOTE — HISTORY OF PRESENT ILLNESS
[Localized Pain] : localized breast pain [Right Breast] : right [Left Breast] : left [Postpartum] : being postpartum [Nursing] : nursing [Breast Surgery] : no history of breast surgery [FreeTextEntry9] : engorgement

## 2022-09-20 NOTE — DISCUSSION/SUMMARY
[FreeTextEntry1] : Discussed in detail ways to prevent mastitis and signs and symptoms of mastitis. Advised her to call if she experiences fever, pain, redness.\par Patient to speak to her pediatrician about lactation consultant she can f/u with. \par She is to schedule her annual visit for november.\par all her questions were answered she is agreeable with plan.\par \par Written by Brandy SCHOFIELD, acting as a scribe for Dr. Mogran. This note accurately reflects the work and decisions made by me.\par

## 2022-10-06 ENCOUNTER — APPOINTMENT (OUTPATIENT)
Dept: OBGYN | Facility: CLINIC | Age: 32
End: 2022-10-06

## 2022-10-06 VITALS
WEIGHT: 121 LBS | HEART RATE: 72 BPM | DIASTOLIC BLOOD PRESSURE: 77 MMHG | SYSTOLIC BLOOD PRESSURE: 131 MMHG | BODY MASS INDEX: 22.13 KG/M2

## 2022-10-06 LAB
BILIRUB UR QL STRIP: NORMAL
CLARITY UR: CLEAR
COLLECTION METHOD: NORMAL
GLUCOSE UR-MCNC: NORMAL
HCG UR QL: 0.2 EU/DL
HEMOGLOBIN: 12.5
HGB UR QL STRIP.AUTO: NORMAL
KETONES UR-MCNC: NORMAL
LEUKOCYTE ESTERASE UR QL STRIP: NORMAL
NITRITE UR QL STRIP: NORMAL
PH UR STRIP: 7
PROT UR STRIP-MCNC: NORMAL
SP GR UR STRIP: 1.02

## 2022-10-06 PROCEDURE — 85018 HEMOGLOBIN: CPT | Mod: QW

## 2022-10-06 PROCEDURE — 99212 OFFICE O/P EST SF 10 MIN: CPT | Mod: 25

## 2022-10-06 PROCEDURE — 81003 URINALYSIS AUTO W/O SCOPE: CPT | Mod: QW

## 2022-10-06 NOTE — CHIEF COMPLAINT
[Urgent Visit] : Urgent Visit [FreeTextEntry1] : Patient performed a visual self-exam with a mirror and noted a lump in the vaginal canal.

## 2022-10-06 NOTE — PHYSICAL EXAM
[Chaperone Present] : A chaperone was present in the examining room during all aspects of the physical examination [FreeTextEntry1] : Elsa Nieto RN present for this physical exam.

## 2022-10-06 NOTE — COUNSELING
[Breast Self Exam] : breast self exam [Contraception] : contraception [FreeTextEntry2] : Discussed postpartum intercourse.

## 2022-10-17 ENCOUNTER — NON-APPOINTMENT (OUTPATIENT)
Age: 32
End: 2022-10-17

## 2022-10-17 RX ORDER — DICLOXACILLIN SODIUM 500 MG/1
500 CAPSULE ORAL 4 TIMES DAILY
Qty: 40 | Refills: 0 | Status: ACTIVE | COMMUNITY
Start: 2022-09-06 | End: 1900-01-01

## 2022-11-09 ENCOUNTER — APPOINTMENT (OUTPATIENT)
Dept: OBGYN | Facility: CLINIC | Age: 32
End: 2022-11-09

## 2023-01-04 ENCOUNTER — APPOINTMENT (OUTPATIENT)
Dept: OBGYN | Facility: CLINIC | Age: 33
End: 2023-01-04
Payer: COMMERCIAL

## 2023-01-04 ENCOUNTER — RESULT CHARGE (OUTPATIENT)
Age: 33
End: 2023-01-04

## 2023-01-04 VITALS — SYSTOLIC BLOOD PRESSURE: 100 MMHG | DIASTOLIC BLOOD PRESSURE: 60 MMHG

## 2023-01-04 DIAGNOSIS — Z00.00 ENCOUNTER FOR GENERAL ADULT MEDICAL EXAMINATION W/OUT ABNORMAL FINDINGS: ICD-10-CM

## 2023-01-04 LAB
BILIRUB UR QL STRIP: NORMAL
CLARITY UR: CLEAR
COLLECTION METHOD: NORMAL
GLUCOSE UR-MCNC: NORMAL
HCG UR QL: 0.2 EU/DL
HEMOGLOBIN: 13
HGB UR QL STRIP.AUTO: NORMAL
KETONES UR-MCNC: NORMAL
LEUKOCYTE ESTERASE UR QL STRIP: NORMAL
NITRITE UR QL STRIP: NORMAL
PH UR STRIP: 6
PROT UR STRIP-MCNC: NORMAL
SP GR UR STRIP: 1.03

## 2023-01-04 PROCEDURE — 81003 URINALYSIS AUTO W/O SCOPE: CPT | Mod: QW

## 2023-01-04 PROCEDURE — 99395 PREV VISIT EST AGE 18-39: CPT

## 2023-01-04 PROCEDURE — 85018 HEMOGLOBIN: CPT | Mod: QW

## 2023-01-04 RX ORDER — ASCORBIC ACID, CHOLECALCIFEROL, .ALPHA.-TOCOPHEROL ACETATE, DL-, PYRIDOXINE, FOLIC ACID, CYANOCOBALAMIN, CALCIUM, FERROUS FUMARATE, MAGNESIUM, DOCONEXENT 85; 200; 10; 25; 1; 12; 140; 27; 45; 300 [IU]/1; [IU]/1; [IU]/1; [IU]/1; MG/1; UG/1; MG/1; MG/1; MG/1; MG/1
27-0.6-0.4-3 CAPSULE, GELATIN COATED ORAL
Qty: 3 | Refills: 3 | Status: ACTIVE | COMMUNITY
Start: 2023-01-04 | End: 1900-01-01

## 2023-01-05 NOTE — PLAN
[FreeTextEntry1] : \par \par Patient to follow up in 1 year for annual GYN exam\par Mammogram and bilateral breast US due:  42 \par Colonoscopy due:  45\par Bone density due:  pm\par \par Advised lubrication for entrance pain with intercourse. patient to call me if she still has this pain by july. Likely due to vaginal delivery which should get better over time. Referral to [facility] PT she verbalizes understanding and is agreeable with plan. \par \par Pap ordered\par Hemoccult ordered \par All questions answered, patient agreeable with plan.\par \par \par \par I Brandy FLANAGANC am scribing for the presence of Dr. Morgan the following sections HISTORY OF PRESENT ILLNESS, PAST MEDICAL/FAMILY/SOCIAL HISTORY; REVIEW OF SYSTEMS; VITAL SIGNS; PHYSICAL EXAM; DISPOSITION. \par \par \par I personally performed the services described in the documentation, reviewed the documentation recorded by the scribe in my presence and it accurately and completely records my words and actions.\par

## 2023-01-05 NOTE — HISTORY OF PRESENT ILLNESS
[TextBox_4] : Patient is a 33 y/o female here today for annual visit. She is s/p vaginal delivery in june. She complains of entrance pain with intercourse. She denies any other complaints at this time \par condoms for birth control plans for pregnancy in near future.

## 2023-01-05 NOTE — PHYSICAL EXAM
[Chaperone Present] : A chaperone was present in the examining room during all aspects of the physical examination [Appropriately responsive] : appropriately responsive [Alert] : alert [No Lymphadenopathy] : no lymphadenopathy [Soft] : soft [Non-tender] : non-tender [Oriented x3] : oriented x3 [Examination Of The Breasts] : a normal appearance [No Discharge] : no discharge [No Masses] : no breast masses were palpable [Labia Majora] : normal [Labia Minora] : normal [No Bleeding] : There was no active vaginal bleeding [Normal] : normal [Uterine Adnexae] : normal [FreeTextEntry1] : Brandy MANJARREZ-SAYDA chaperoned during entire physical exam [FreeTextEntry4] : no pelvic floor muscle spasms of tenderness

## 2023-01-11 LAB — CYTOLOGY CVX/VAG DOC THIN PREP: NORMAL

## 2024-09-19 ENCOUNTER — APPOINTMENT (OUTPATIENT)
Dept: OBGYN | Facility: CLINIC | Age: 34
End: 2024-09-19
Payer: OTHER GOVERNMENT

## 2024-09-19 DIAGNOSIS — N89.8 OTHER SPECIFIED NONINFLAMMATORY DISORDERS OF VAGINA: ICD-10-CM

## 2024-09-19 DIAGNOSIS — Z00.00 ENCOUNTER FOR GENERAL ADULT MEDICAL EXAMINATION W/OUT ABNORMAL FINDINGS: ICD-10-CM

## 2024-09-19 LAB
BILIRUB UR QL STRIP: NEGATIVE
CLARITY UR: CLEAR
COLLECTION METHOD: NORMAL
GLUCOSE UR-MCNC: NEGATIVE
HCG UR QL: 0 EU/DL
HGB UR QL STRIP.AUTO: NORMAL
KETONES UR-MCNC: NEGATIVE
LEUKOCYTE ESTERASE UR QL STRIP: NORMAL
NITRITE UR QL STRIP: NEGATIVE
PH UR STRIP: 6
PROT UR STRIP-MCNC: NEGATIVE
SP GR UR STRIP: 1

## 2024-09-19 PROCEDURE — 81003 URINALYSIS AUTO W/O SCOPE: CPT | Mod: QW

## 2024-09-19 PROCEDURE — 99213 OFFICE O/P EST LOW 20 MIN: CPT

## 2024-09-19 RX ORDER — NYSTATIN AND TRIAMCINOLONE ACETONIDE 100000; 1 MG/G; MG/G
100000-0.1 CREAM TOPICAL 3 TIMES DAILY
Qty: 1 | Refills: 0 | Status: ACTIVE | COMMUNITY
Start: 2024-09-19 | End: 1900-01-01

## 2024-09-19 NOTE — PHYSICAL EXAM
[Labia Minora Erythema] : erythema of the labia minora [Normal] : uterus [Moderate] : there was moderate vaginal bleeding [Uterine Adnexae] : were not tender and not enlarged

## 2024-09-19 NOTE — DISCUSSION/SUMMARY
[FreeTextEntry1] : mycolog cream rx given for vaginal irritation if her symptoms do not improve in the next few days she is to call us.  recommend she use tampons as pads can increase moisture to the area leading to more external irritation.  all questions answered, pt agreeable with plan.

## 2024-09-19 NOTE — CHIEF COMPLAINT
[Urgent Visit] : Urgent Visit [FreeTextEntry1] : 34 year old female c/o external vaginal irritation. she is currently on her menses and first noticed it a few days ago. she initially thought it could be from tampon use.

## 2024-10-10 PROBLEM — Z01.419 ENCOUNTER FOR ANNUAL ROUTINE GYNECOLOGICAL EXAMINATION: Status: ACTIVE | Noted: 2024-10-10

## 2024-10-10 PROBLEM — Z12.4 CERVICAL CANCER SCREENING: Status: ACTIVE | Noted: 2024-10-10

## 2024-10-17 ENCOUNTER — APPOINTMENT (OUTPATIENT)
Dept: OBGYN | Facility: CLINIC | Age: 34
End: 2024-10-17

## 2024-10-17 DIAGNOSIS — Z01.419 ENCOUNTER FOR GYNECOLOGICAL EXAMINATION (GENERAL) (ROUTINE) W/OUT ABNORMAL FINDINGS: ICD-10-CM

## 2024-10-17 DIAGNOSIS — Z12.4 ENCOUNTER FOR SCREENING FOR MALIGNANT NEOPLASM OF CERVIX: ICD-10-CM

## 2024-11-12 ENCOUNTER — APPOINTMENT (OUTPATIENT)
Dept: OBGYN | Facility: CLINIC | Age: 34
End: 2024-11-12

## 2024-12-05 ENCOUNTER — APPOINTMENT (OUTPATIENT)
Dept: OBGYN | Facility: CLINIC | Age: 34
End: 2024-12-05
Payer: SELF-PAY

## 2024-12-05 ENCOUNTER — APPOINTMENT (OUTPATIENT)
Dept: OBGYN | Facility: CLINIC | Age: 34
End: 2024-12-05

## 2024-12-05 ENCOUNTER — NON-APPOINTMENT (OUTPATIENT)
Age: 34
End: 2024-12-05

## 2024-12-05 VITALS
BODY MASS INDEX: 22.13 KG/M2 | HEART RATE: 72 BPM | WEIGHT: 121 LBS | SYSTOLIC BLOOD PRESSURE: 127 MMHG | DIASTOLIC BLOOD PRESSURE: 87 MMHG

## 2024-12-05 DIAGNOSIS — Z01.419 ENCOUNTER FOR GYNECOLOGICAL EXAMINATION (GENERAL) (ROUTINE) W/OUT ABNORMAL FINDINGS: ICD-10-CM

## 2024-12-05 DIAGNOSIS — Z34.90 ENCOUNTER FOR SUPERVISION OF NORMAL PREGNANCY, UNSPECIFIED, UNSPECIFIED TRIMESTER: ICD-10-CM

## 2024-12-05 LAB
BILIRUB UR QL STRIP: NEGATIVE
CLARITY UR: CLEAR
COLLECTION METHOD: NORMAL
GLUCOSE UR-MCNC: NEGATIVE
HCG UR QL: 0 EU/DL
HEMOGLOBIN: 12.7
HGB UR QL STRIP.AUTO: NEGATIVE
KETONES UR-MCNC: NEGATIVE
LEUKOCYTE ESTERASE UR QL STRIP: NORMAL
NITRITE UR QL STRIP: NEGATIVE
PH UR STRIP: 7
PROT UR STRIP-MCNC: NEGATIVE
SP GR UR STRIP: 1

## 2024-12-05 PROCEDURE — 85018 HEMOGLOBIN: CPT | Mod: NC,QW

## 2024-12-30 ENCOUNTER — NON-APPOINTMENT (OUTPATIENT)
Age: 34
End: 2024-12-30

## 2025-01-06 ENCOUNTER — APPOINTMENT (OUTPATIENT)
Dept: OBGYN | Facility: CLINIC | Age: 35
End: 2025-01-06

## 2025-01-06 ENCOUNTER — APPOINTMENT (OUTPATIENT)
Dept: ANTEPARTUM | Facility: CLINIC | Age: 35
End: 2025-01-06

## 2025-06-05 ENCOUNTER — NON-APPOINTMENT (OUTPATIENT)
Age: 35
End: 2025-06-05